# Patient Record
Sex: FEMALE | Race: WHITE
[De-identification: names, ages, dates, MRNs, and addresses within clinical notes are randomized per-mention and may not be internally consistent; named-entity substitution may affect disease eponyms.]

---

## 2020-01-19 ENCOUNTER — HOSPITAL ENCOUNTER (INPATIENT)
Dept: HOSPITAL 96 - M.ERS | Age: 50
LOS: 2 days | Discharge: HOME | DRG: 178 | End: 2020-01-21
Attending: INTERNAL MEDICINE | Admitting: INTERNAL MEDICINE
Payer: COMMERCIAL

## 2020-01-19 VITALS — SYSTOLIC BLOOD PRESSURE: 163 MMHG | DIASTOLIC BLOOD PRESSURE: 76 MMHG

## 2020-01-19 VITALS — SYSTOLIC BLOOD PRESSURE: 106 MMHG | DIASTOLIC BLOOD PRESSURE: 65 MMHG

## 2020-01-19 VITALS — HEIGHT: 65 IN | WEIGHT: 220 LBS | BODY MASS INDEX: 36.65 KG/M2

## 2020-01-19 VITALS — DIASTOLIC BLOOD PRESSURE: 85 MMHG | SYSTOLIC BLOOD PRESSURE: 145 MMHG

## 2020-01-19 DIAGNOSIS — Z85.038: ICD-10-CM

## 2020-01-19 DIAGNOSIS — E44.0: ICD-10-CM

## 2020-01-19 DIAGNOSIS — Z90.710: ICD-10-CM

## 2020-01-19 DIAGNOSIS — E05.00: ICD-10-CM

## 2020-01-19 DIAGNOSIS — Z86.010: ICD-10-CM

## 2020-01-19 DIAGNOSIS — J15.6: Primary | ICD-10-CM

## 2020-01-19 DIAGNOSIS — Z88.8: ICD-10-CM

## 2020-01-19 DIAGNOSIS — Z86.711: ICD-10-CM

## 2020-01-19 DIAGNOSIS — J45.909: ICD-10-CM

## 2020-01-19 DIAGNOSIS — Z88.5: ICD-10-CM

## 2020-01-19 DIAGNOSIS — Z86.718: ICD-10-CM

## 2020-01-19 DIAGNOSIS — Z85.43: ICD-10-CM

## 2020-01-19 DIAGNOSIS — Z15.09: ICD-10-CM

## 2020-01-19 DIAGNOSIS — Z79.899: ICD-10-CM

## 2020-01-19 DIAGNOSIS — E03.9: ICD-10-CM

## 2020-01-19 DIAGNOSIS — E06.3: ICD-10-CM

## 2020-01-19 LAB
ABSOLUTE BASOPHILS: 0 THOU/UL (ref 0–0.2)
ABSOLUTE EOSINOPHILS: 0.1 THOU/UL (ref 0–0.7)
ABSOLUTE MONOCYTES: 0.3 THOU/UL (ref 0–1.2)
ALBUMIN SERPL-MCNC: 2.9 G/DL (ref 3.4–5)
ALP SERPL-CCNC: 111 U/L (ref 46–116)
ALT SERPL-CCNC: 39 U/L (ref 30–65)
ANION GAP SERPL CALC-SCNC: 9 MMOL/L (ref 7–16)
AST SERPL-CCNC: 38 U/L (ref 15–37)
BASOPHILS NFR BLD AUTO: 0.7 %
BILIRUB SERPL-MCNC: 0.2 MG/DL
BUN SERPL-MCNC: 11 MG/DL (ref 7–18)
CALCIUM SERPL-MCNC: 9 MG/DL (ref 8.5–10.1)
CHLORIDE SERPL-SCNC: 99 MMOL/L (ref 98–107)
CO2 SERPL-SCNC: 30 MMOL/L (ref 21–32)
CREAT SERPL-MCNC: 0.8 MG/DL (ref 0.6–1.3)
EOSINOPHIL NFR BLD: 1.9 %
GLUCOSE SERPL-MCNC: 99 MG/DL (ref 70–99)
GRANULOCYTES NFR BLD MANUAL: 79.5 %
HCT VFR BLD CALC: 41.1 % (ref 37–47)
HGB BLD-MCNC: 14.2 GM/DL (ref 12–15)
LYMPHOCYTES # BLD: 0.6 THOU/UL (ref 0.8–5.3)
LYMPHOCYTES NFR BLD AUTO: 12.3 %
MCH RBC QN AUTO: 31.7 PG (ref 26–34)
MCHC RBC AUTO-ENTMCNC: 34.5 G/DL (ref 28–37)
MCV RBC: 92 FL (ref 80–100)
MONOCYTES NFR BLD: 5.6 %
MPV: 7.4 FL. (ref 7.2–11.1)
NEUTROPHILS # BLD: 4.2 THOU/UL (ref 1.6–8.1)
NT-PRO BRAIN NAT PEPTIDE: 138 PG/ML (ref ?–300)
NUCLEATED RBCS: 0 /100WBC
PLATELET COUNT*: 277 THOU/UL (ref 150–400)
POTASSIUM SERPL-SCNC: 4 MMOL/L (ref 3.5–5.1)
PROT SERPL-MCNC: 8.3 G/DL (ref 6.4–8.2)
RBC # BLD AUTO: 4.47 MIL/UL (ref 4.2–5)
RDW-CV: 13.3 % (ref 10.5–14.5)
SODIUM SERPL-SCNC: 138 MMOL/L (ref 136–145)
WBC # BLD AUTO: 5.3 THOU/UL (ref 4–11)

## 2020-01-19 NOTE — NUR
PT ADMITTED TO FLOOR PER CART ACCOMPANIED BY ER STAFF AND  WITH
BELONGINGS. ORIENTED TO ROOM AND CALL LITE, HISTORY OBTAINED AND ASSESSMENT
PERFORMED, SEE ADMIT NOTES. PT DENIES PAIN AT PRESENT. ROOM AIR SAT 95%.
CONGESTED NP COUGH HEARD. LAC IV ABX INFUSING PER PUMP. WILL CONTINUE TO
MONITOR AND PROVIDE CARES AS NEEDED.

## 2020-01-20 VITALS — SYSTOLIC BLOOD PRESSURE: 125 MMHG | DIASTOLIC BLOOD PRESSURE: 76 MMHG

## 2020-01-20 VITALS — SYSTOLIC BLOOD PRESSURE: 107 MMHG | DIASTOLIC BLOOD PRESSURE: 68 MMHG

## 2020-01-20 VITALS — SYSTOLIC BLOOD PRESSURE: 111 MMHG | DIASTOLIC BLOOD PRESSURE: 66 MMHG

## 2020-01-20 VITALS — DIASTOLIC BLOOD PRESSURE: 72 MMHG | SYSTOLIC BLOOD PRESSURE: 112 MMHG

## 2020-01-20 NOTE — NUR
PATIENT UP WITH SBA THI SHIFT, PATIENT STATED SHE DID TAKE LAPS AROUND HER
ROOM INDEPENDENTLY WITHOUT DIFFICULTY. IV VANC AND ZOSYN INFUSED AS ORDERED.
PATIENT DRY HEAVING AND DID VOMIT MINIMAL AMOUNT OF CLEAR FLUID THIS
AFTERNOON, PRN COMPAZINE GIVEN AS ORDERED. PULM/ID CONSULTED AND SAW PATIENT
THIS SHIFT. NO COMPLAINTS OF PAIN. PATIENT REQUESTING TYLENOL THIS AFTERNOON,
STATED SHE FELT LIKE SHE HAD A FEVER. TEMP 99.6 AT THAT TIME. ISOLATION
REMOVED AS PATIENT WAS MRSA NEGATIVE. SPUTUM CUP IN PLACE AT BEDSIDE FOR
SAMPLE.

## 2020-01-20 NOTE — NUR
Pt lives at home with .  Pt does not have any DME or any known history
of HH or SNF.  Pt has friends and  and children for support.  SW to
continue to follow to assist with safe dc planning.

## 2020-01-20 NOTE — NUR
PT HAS SLEPT OFF AND ON SINCE ADMIT, UP SBA TO BR TO VOID WITHOUT DIFFICULTY.
NASAL SWAB MRSA SENT TO LAB-PENDING. ON CONTACT ISOLATION FOR HX MRSA SINUS
AND LUNGS. IBUPROFEN AND TYLENOL GIVEN FOR CO HEADACHE, BODYACHE. MUCINEX FOR
CONGESTED NP COUGH. LAC SL, ABX GIVEN AS ORDERED. ABLE TO  USE CALL LITE AND
MAKE NEEDS KNOWN. TUMS GIVEN FOR CO INDIGESTION.

## 2020-01-21 VITALS — SYSTOLIC BLOOD PRESSURE: 120 MMHG | DIASTOLIC BLOOD PRESSURE: 73 MMHG

## 2020-01-21 VITALS — SYSTOLIC BLOOD PRESSURE: 102 MMHG | DIASTOLIC BLOOD PRESSURE: 56 MMHG

## 2020-01-21 VITALS — DIASTOLIC BLOOD PRESSURE: 73 MMHG | SYSTOLIC BLOOD PRESSURE: 120 MMHG

## 2020-01-21 NOTE — NUR
PATIENT FEELING MUCH BETTER TODAY, UP AMBULATING AROUND ROOM. DR. SANTOYO AND
DR. DUNCAN FROM PULMONARY NOTIFIED THAT PATIENT COUGHING UP THICK DARK SPUTUM. IV
DC'D AND STARTED ON PO ABX. PATIENT GIVEN 1ST DOSE OF CEFDINIR AND NO
DIFFICULTY NOTED. PATIENT VERBALIZED UNDERSTANDING OF PAPERWORK AND SCRIPTS.
PATIENT TAKEN OUT VIA WHEELCHAIR WITH ALL BELONGINGS.

## 2020-01-21 NOTE — CON
[address]  [address]                    CONSULTATION                  
_______________________________________________________________________________
 
Name:       DEREK FARMERIRAIDA                 Room:           [address] [place]  
M.R.#:  [id number]      Account #:      [id number]  
Admission:  01/19/20     Attend Phys:    Mayo Carreno MD 
Discharge:               Date of Birth:  [birth year]  
         Report #: [id number]
                                                                     [id number]  
_______________________________________________________________________________
THIS REPORT FOR:  //name//                      
 
CC: Mayo Tejeda
 
DATE OF SERVICE:  01/20/2020
 
 
INFECTIOUS DISEASE CONSULTATION
 
ATTENDING PHYSICIAN:  Dr. Serna.
 
REASON FOR EVALUATION:  Community-acquired pneumonia.
 
HISTORY OF PRESENT ILLNESS:  Chart reviewed, patient examined.  This is a
49-year-old with very extensive medical history, does have a history of multiple
carcinomas described as Banda syndrome and also history of complicated upper as
well as lower respiratory tract infection number of years ago due to MRSA.  In
fact, she has actually been ill over the last several days.  As an outpatient,
she was evaluated ____ including chest x-ray, was diagnosed with pneumonitis,
and was treated with daily infusions of ceftriaxone without improvement.  She
had high-grade temperature elevations.  She had persistent cough that was
intermittently productive associated chest pain.  She has had anorexia with poor
p.o. intake.  Due to lack of response, she was referred and subsequently
admitted through the Emergency Room.  She was placed on empiric broad-spectrum
therapy with piperacillin, tazobactam as well as vancomycin.
 
ALLERGIES:  HYDROCODONE, OMEPRAZOLE, AND AZITHROMYCIN.
 
CURRENT MEDICATIONS:  Include acyclovir, prochlorperazine, levothyroxine,
vancomycin, Zosyn, guaifenesin, ipratropium and albuterol inhaler.
 
PAST MEDICAL HISTORY:  As described above, asthma, history of recurrent
pneumonitis autoimmune thyroid disease with Graves', history of ovarian cancer,
previous hysterectomy, history of PE with DVT.
 
SOCIAL HISTORY:  Never smoker.  No ethanol, no illicit drug use.
 
FAMILY HISTORY:  Noncontributory.
 
REVIEW OF SYSTEMS:  As above.
 
PHYSICAL EXAMINATION:
GENERAL:  She appears ill, not overtly toxic.  She is in moderate distress
secondary to her breathing that has some frequent paroxysms of coughing.  She
appears undernourished.
 
 
 
[place] [address]                    CONSULTATION                  
_______________________________________________________________________________
 
Name:       VLAD FARMER ANN                 Room:           [address]#:  [id number]      Account #:      [id number]  
Admission:  01/19/20     Attend Phys:    Mayo Carreno MD 
Discharge:               Date of Birth:  [birth year]  
         Report #: [id number]
                                                                     [id number]  
_______________________________________________________________________________
VITAL SIGNS:  Temperature max 100.5, more recently 98.5; pulse 83, respirations
18, blood pressure 107/68.
SKIN:  Warm, dry, no rashes.
HEENT:  Normocephalic.  Extraocular muscles intact.
NECK:  Supple.
LUNGS:  Scattered coarse breath sounds bilaterally.
HEART:  Regular.  I do not appreciate murmur.
ABDOMEN:  Somewhat tender.  There are no peritoneal signs.
GENITOURINARY AND RECTAL:  Deferred.
 
LABORATORY DATA:  Blood cultures sterile thus far.  Lactic acid 1.0.  CT of the
chest with contrast.  Dense apical lobar pneumonitis.  CT of the sinuses, some
chronic changes with thickening of the mucosa.  Electrolytes; sodium 138,
potassium 4.0, chloride 99, bicarbonate is 30, anion gap of 9, BUN and
creatinine 11 and 0.8.  AST of 38, ALT of 39, total protein 8.3, albumin of 2.9.
 Influenza antigen was negative.  D-dimer elevated at 1.51.  CBC; white count of
5.3, H and H 14.2 and 41.1, platelets of 277.
 
ASSESSMENT:  Community-acquired pneumonia of a lobar type.  We will continue
combination of empiric antimicrobial therapy, presume atypical with pyogenic
component.  We will monitor expectantly, give her supportive care.  We will see
if she coughs up any phlegm that would help identify in the absence of
improvement, may consider bronchoscopy.  We will monitor expectantly.
 
 
 
 
 
 
 
 
 
 
 
 
 
 
 
 
 
 
 
 
 
<ELECTRONICALLY SIGNED>
                                        By:  Frederic Gaona MD           
01/21/20     0806
D: 01/20/20 1210_______________________________________
T: 01/20/20 [id number] Gaona MD              /nt

## 2020-01-21 NOTE — NUR
PT SLEPT ON AND OFF OVERNIGHT. RECEIVING TYLENOL FOR HEADACHE AND LOW GRAD
TEMP WITH FAIR RESULT. UP AD ALEX IN ROOM, TO BR TO VOID-SPECIMEN SENT TO LAB.
LUNGS WHEEZES, COARSE, CONGESTED COUGH WITH GRAYISH SPUTUM SENT TO LAB.
COMPAZINE GIVEN AT HS FOR NAUSEA WITHOUT EMESIS WITH GOOD RESULT. NO LABS THIS
MORNING.LAC SL, ABX GIVEN AS ORDERED. PULMONARY AND ID FOLLOWING. AOX4, ABLE
TO USE CALL LITE AND MAKE NEEDS KNOWN.

## 2020-02-10 ENCOUNTER — HOSPITAL ENCOUNTER (OUTPATIENT)
Dept: HOSPITAL 96 - M.RAD | Age: 50
End: 2020-02-10
Payer: COMMERCIAL

## 2020-02-10 DIAGNOSIS — J18.9: Primary | ICD-10-CM

## 2020-02-10 DIAGNOSIS — R91.8: ICD-10-CM

## 2020-06-23 ENCOUNTER — HOSPITAL ENCOUNTER (OUTPATIENT)
Dept: HOSPITAL 96 - M.LAB | Age: 50
End: 2020-06-23
Attending: FAMILY MEDICINE
Payer: COMMERCIAL

## 2020-06-23 DIAGNOSIS — U07.1: Primary | ICD-10-CM

## 2021-01-22 ENCOUNTER — HOSPITAL ENCOUNTER (OUTPATIENT)
Dept: HOSPITAL 96 - M.LAB | Age: 51
End: 2021-01-22
Attending: FAMILY MEDICINE
Payer: COMMERCIAL

## 2021-01-22 DIAGNOSIS — Z86.14: ICD-10-CM

## 2021-01-22 DIAGNOSIS — R51.9: Primary | ICD-10-CM

## 2021-03-03 ENCOUNTER — HOSPITAL ENCOUNTER (EMERGENCY)
Dept: HOSPITAL 96 - M.ERS | Age: 51
Discharge: HOME | End: 2021-03-03
Payer: COMMERCIAL

## 2021-03-03 VITALS — SYSTOLIC BLOOD PRESSURE: 138 MMHG | DIASTOLIC BLOOD PRESSURE: 95 MMHG

## 2021-03-03 VITALS — BODY MASS INDEX: 36.32 KG/M2 | HEIGHT: 65 IN | WEIGHT: 217.99 LBS

## 2021-03-03 DIAGNOSIS — M79.651: ICD-10-CM

## 2021-03-03 DIAGNOSIS — Z86.711: ICD-10-CM

## 2021-03-03 DIAGNOSIS — Z88.1: ICD-10-CM

## 2021-03-03 DIAGNOSIS — Z88.8: ICD-10-CM

## 2021-03-03 DIAGNOSIS — Z87.01: ICD-10-CM

## 2021-03-03 DIAGNOSIS — M79.662: Primary | ICD-10-CM

## 2021-03-03 DIAGNOSIS — Z86.718: ICD-10-CM

## 2021-03-03 DIAGNOSIS — Z86.14: ICD-10-CM

## 2021-03-03 DIAGNOSIS — Z90.710: ICD-10-CM

## 2021-03-03 DIAGNOSIS — J45.909: ICD-10-CM

## 2021-03-03 DIAGNOSIS — Z88.5: ICD-10-CM

## 2021-03-03 LAB
ANION GAP SERPL CALC-SCNC: 7 MMOL/L (ref 7–16)
BUN SERPL-MCNC: 19 MG/DL (ref 7–18)
CALCIUM SERPL-MCNC: 8.8 MG/DL (ref 8.5–10.1)
CHLORIDE SERPL-SCNC: 103 MMOL/L (ref 98–107)
CO2 SERPL-SCNC: 29 MMOL/L (ref 21–32)
CREAT SERPL-MCNC: 0.8 MG/DL (ref 0.6–1.3)
GLUCOSE SERPL-MCNC: 100 MG/DL (ref 70–99)
HCT VFR BLD CALC: 38.5 % (ref 37–47)
HGB BLD-MCNC: 13 GM/DL (ref 12–15)
MCH RBC QN AUTO: 31.6 PG (ref 26–34)
MCHC RBC AUTO-ENTMCNC: 33.6 G/DL (ref 28–37)
MCV RBC: 93.9 FL (ref 80–100)
MPV: 7.5 FL. (ref 7.2–11.1)
PLATELET COUNT*: 249 THOU/UL (ref 150–400)
POTASSIUM SERPL-SCNC: 4.4 MMOL/L (ref 3.5–5.1)
RBC # BLD AUTO: 4.1 MIL/UL (ref 4.2–5)
RDW-CV: 13.9 % (ref 10.5–14.5)
SODIUM SERPL-SCNC: 139 MMOL/L (ref 136–145)
WBC # BLD AUTO: 5.8 THOU/UL (ref 4–11)

## 2021-03-04 NOTE — EKG
[place] [address]
Phone:  [phone]                     ELECTROCARDIOGRAM REPORT      
_______________________________________________________________________________
 
Name:         VLAD FARMER                Room:                     [facility]#:    [id number]     Account #:     [id number]  
Admission:    21    Attend Phys:                     
Discharge:    21    Date of Birth: [birth year]  
Date of Service: 21 1700  Report #:      [id number]
        [id number]
_______________________________________________________________________________
THIS REPORT FOR:  //name//                      
 
                         [facility] ED
                                       
Test Date:    2021               Test Time:    17:00:31
Pat Name:     VLAD FARMER               Department:   
Patient ID:   [id number]            Room:          
Gender:                               Technician:   
:          [id number]               Requested By: Fer Arredondo
Order Number: [id number] MD:   Ruben Grajeda
                                 Measurements
Intervals                              Axis          
Rate:         73                       P:            27
ID:           [id number]                      QRS:          -20
QRSD:         102                      T:            12
QT:           392                                    
QTc:          432                                    
                           Interpretive Statements
Sinus rhythm
Borderline left axis deviation
Low voltage, precordial leads
No previous ECG available for comparison
Electronically Signed On 3-4-2021 11:05:43 CST by Ruben Grajeda
[email or web address]
 
 
 
 
 
 
 
 
 
 
 
 
 
 
 
 
 
 
 
 
  <ELECTRONICALLY SIGNED>
                                           By: Ruben Grajeda MD, [facility]      
  21     1105
D: 21   _____________________________________
T: 21   Ruben Grajeda MD, FAC        /EPI

## 2021-11-10 ENCOUNTER — HOSPITAL ENCOUNTER (OUTPATIENT)
Dept: HOSPITAL 96 - M.ULTRA | Age: 51
End: 2021-11-10
Attending: FAMILY MEDICINE
Payer: COMMERCIAL

## 2021-11-10 DIAGNOSIS — M79.89: Primary | ICD-10-CM

## 2021-11-10 DIAGNOSIS — R23.8: ICD-10-CM

## 2021-11-10 DIAGNOSIS — Z87.2: ICD-10-CM

## 2021-12-13 ENCOUNTER — HOSPITAL ENCOUNTER (OUTPATIENT)
Dept: HOSPITAL 96 - M.LAB | Age: 51
End: 2021-12-13
Attending: INTERNAL MEDICINE
Payer: COMMERCIAL

## 2021-12-13 DIAGNOSIS — Z20.822: ICD-10-CM

## 2021-12-13 DIAGNOSIS — Z01.812: Primary | ICD-10-CM
